# Patient Record
Sex: FEMALE | Race: WHITE | NOT HISPANIC OR LATINO | Employment: STUDENT | URBAN - METROPOLITAN AREA
[De-identification: names, ages, dates, MRNs, and addresses within clinical notes are randomized per-mention and may not be internally consistent; named-entity substitution may affect disease eponyms.]

---
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
## 2025-01-17 ENCOUNTER — OFFICE VISIT (OUTPATIENT)
Dept: PEDIATRICS | Facility: CLINIC | Age: 20
End: 2025-01-17
Payer: COMMERCIAL

## 2025-01-17 VITALS
SYSTOLIC BLOOD PRESSURE: 103 MMHG | DIASTOLIC BLOOD PRESSURE: 70 MMHG | BODY MASS INDEX: 21.74 KG/M2 | HEART RATE: 83 BPM | TEMPERATURE: 98 F | RESPIRATION RATE: 20 BRPM | WEIGHT: 134.69 LBS

## 2025-01-17 DIAGNOSIS — L05.91 PILONIDAL CYST: Primary | ICD-10-CM

## 2025-01-17 PROCEDURE — 3008F BODY MASS INDEX DOCD: CPT | Mod: CPTII,S$GLB,, | Performed by: PEDIATRICS

## 2025-01-17 PROCEDURE — 1159F MED LIST DOCD IN RCRD: CPT | Mod: CPTII,S$GLB,, | Performed by: PEDIATRICS

## 2025-01-17 PROCEDURE — 3074F SYST BP LT 130 MM HG: CPT | Mod: CPTII,S$GLB,, | Performed by: PEDIATRICS

## 2025-01-17 PROCEDURE — 99999 PR PBB SHADOW E&M-EST. PATIENT-LVL III: CPT | Mod: PBBFAC,,, | Performed by: PEDIATRICS

## 2025-01-17 PROCEDURE — 99213 OFFICE O/P EST LOW 20 MIN: CPT | Mod: S$GLB,,, | Performed by: PEDIATRICS

## 2025-01-17 PROCEDURE — 1160F RVW MEDS BY RX/DR IN RCRD: CPT | Mod: CPTII,S$GLB,, | Performed by: PEDIATRICS

## 2025-01-17 PROCEDURE — G2211 COMPLEX E/M VISIT ADD ON: HCPCS | Mod: S$GLB,,, | Performed by: PEDIATRICS

## 2025-01-17 PROCEDURE — 3078F DIAST BP <80 MM HG: CPT | Mod: CPTII,S$GLB,, | Performed by: PEDIATRICS

## 2025-01-17 RX ORDER — CEFUROXIME AXETIL 250 MG/1
250 TABLET ORAL 2 TIMES DAILY
COMMUNITY
Start: 2024-12-28

## 2025-01-17 RX ORDER — ALBUTEROL SULFATE 90 UG/1
INHALANT RESPIRATORY (INHALATION)
COMMUNITY
Start: 2025-01-06

## 2025-01-17 RX ORDER — BENZONATATE 200 MG/1
CAPSULE ORAL
COMMUNITY
Start: 2025-01-06

## 2025-01-17 RX ORDER — MUPIROCIN 20 MG/G
OINTMENT TOPICAL 3 TIMES DAILY
Qty: 30 G | Refills: 1 | Status: SHIPPED | OUTPATIENT
Start: 2025-01-17 | End: 2025-01-27

## 2025-01-17 NOTE — PROGRESS NOTES
" Patient presents for visit accompanied by Mom  CC: pinoidal cyst  HPI: Karolyn is a 20 yo female who has a PMH significant for recurrent pilonidal cysts   Has had some areas of redness and swelling for the past several days  She has been using myrrh which has helped significantly . No cough, congestion, or runny nose. Denies ear pain, or sore throat. No vomiting, or diarrhea.  ALL:Reviewed and or Reconciled.  MEDS:Reviewed and or Reconciled.  IMM:UTD  PMH:problem list reviewed  ROS:   CONSTITUTIONAL:alert, interactive   EYES:no eye discharge   ENT:no URI sx   RESP:nl breathing, no wheezing or shortness of breath   GI: no vomiting or diarrhea   SKIN: see hpi    PHYS. EXAM:vital signs have been reviewed(see nurses notes)   GEN:well nourished, well developed. Pain 0/10   SKIN:normal skin turgor, gluteal cleft with healed scars, no active pilonidal disease    EYES:PERRLA, nl conjuctiva   EARS:nl pinnae, TM's intact, right TM nl, left TM nl   NASAL:mucosa pink, no congestion, no discharge   MOUTH: mucus membranes moist, no pharyngeal erythema   NECK:supple, no masses   RESP:nl resp. effort, clear to auscultation   HEART:RRR, nl s1s2, no murmur or edema   ABD: positive BS, soft, NT,ND,no HSM   MS:nl tone and motor movement of extremities   LYMPH:no cervical nodes   PSYCH:in no acute distress, appropriate and interactive     IMP: Karolyn MADRID "Karolyn" was seen today for cyst.    Diagnoses and all orders for this visit:    Pilonidal cyst  -     mupirocin (BACTROBAN) 2 % ointment; Apply topically 3 (three) times daily. for 10 days      Consider laser hair removal  "

## 2025-06-12 ENCOUNTER — OFFICE VISIT (OUTPATIENT)
Dept: PEDIATRICS | Facility: CLINIC | Age: 20
End: 2025-06-12
Payer: COMMERCIAL

## 2025-06-12 VITALS
RESPIRATION RATE: 20 BRPM | SYSTOLIC BLOOD PRESSURE: 103 MMHG | BODY MASS INDEX: 22.03 KG/M2 | TEMPERATURE: 98 F | HEART RATE: 87 BPM | DIASTOLIC BLOOD PRESSURE: 66 MMHG | HEIGHT: 65 IN | WEIGHT: 132.25 LBS

## 2025-06-12 DIAGNOSIS — R11.0 NAUSEA: ICD-10-CM

## 2025-06-12 DIAGNOSIS — N94.6 DYSMENORRHEA: Primary | ICD-10-CM

## 2025-06-12 PROCEDURE — 99999 PR PBB SHADOW E&M-EST. PATIENT-LVL IV: CPT | Mod: PBBFAC,,, | Performed by: PEDIATRICS

## 2025-06-12 PROCEDURE — 1160F RVW MEDS BY RX/DR IN RCRD: CPT | Mod: CPTII,S$GLB,, | Performed by: PEDIATRICS

## 2025-06-12 PROCEDURE — 3008F BODY MASS INDEX DOCD: CPT | Mod: CPTII,S$GLB,, | Performed by: PEDIATRICS

## 2025-06-12 PROCEDURE — G2211 COMPLEX E/M VISIT ADD ON: HCPCS | Mod: S$GLB,,, | Performed by: PEDIATRICS

## 2025-06-12 PROCEDURE — 3078F DIAST BP <80 MM HG: CPT | Mod: CPTII,S$GLB,, | Performed by: PEDIATRICS

## 2025-06-12 PROCEDURE — 1159F MED LIST DOCD IN RCRD: CPT | Mod: CPTII,S$GLB,, | Performed by: PEDIATRICS

## 2025-06-12 PROCEDURE — 3074F SYST BP LT 130 MM HG: CPT | Mod: CPTII,S$GLB,, | Performed by: PEDIATRICS

## 2025-06-12 PROCEDURE — 99214 OFFICE O/P EST MOD 30 MIN: CPT | Mod: S$GLB,,, | Performed by: PEDIATRICS

## 2025-06-12 RX ORDER — ONDANSETRON 4 MG/1
TABLET, ORALLY DISINTEGRATING ORAL
COMMUNITY
Start: 2025-02-25 | End: 2025-06-12 | Stop reason: SDUPTHER

## 2025-06-12 RX ORDER — ONDANSETRON 4 MG/1
4 TABLET, ORALLY DISINTEGRATING ORAL EVERY 8 HOURS PRN
Qty: 30 TABLET | Refills: 6 | Status: SHIPPED | OUTPATIENT
Start: 2025-06-12 | End: 2025-06-14

## 2025-06-12 RX ORDER — IBUPROFEN 600 MG/1
600 TABLET, FILM COATED ORAL EVERY 6 HOURS PRN
Qty: 60 TABLET | Refills: 6 | Status: SHIPPED | OUTPATIENT
Start: 2025-06-12 | End: 2026-06-12

## 2025-06-12 RX ORDER — KIT FOR PREP TC-99M/SUCCIMER 1 MG
VIAL (EA) INTRAVENOUS
COMMUNITY
End: 2025-06-12

## 2025-06-12 RX ORDER — LORATADINE 10 MG/1
10 TABLET ORAL DAILY
COMMUNITY

## 2025-06-12 NOTE — PROGRESS NOTES
"Patient presents for visit accompanied by parent  CC: painful periods  HPI:  Karolyn is a 21 yo female who presents for painful periods  LMP 2nd week May  Periods 7-9 days  Heavy bleeding lasts 4 days   Has been prescribed zofran which has helped  Will [place] to [place] of [place]  Denies fever. No cough, congestion, or runny nose. Denies ear pain, or sore throat. No vomiting, or diarrhea.    ALL:Reviewed and or Reconciled.  MEDS:Reviewed and or Reconciled.  IMM:UTD  PMH:problem list reviewed    ROS:   CONSTITUTIONAL:alert, interactive   EYES:no eye discharge   ENT:no URI sx   RESP:nl breathing, no wheezing or shortness of breath   GI: no vomiting or diarrhea   SKIN:no rash    PHYS. EXAM:vital signs have been reviewed(see nurses notes)   GEN:well nourished, well developed.    SKIN:normal skin turgor, no lesions    EYES:PERRLA, nl conjuctiva   EARS:nl pinnae, TM's intact, right TM nl, left TM nl   NASAL:mucosa pink, no congestion, no discharge   MOUTH: mucus membranes moist, no pharyngeal erythema   NECK:supple, no masses   RESP:nl resp. effort, clear to auscultation   HEART:RRR, nl s1s2, no murmur or edema   ABD: positive BS, soft, NT,ND,no HSM   MS:nl tone and motor movement of extremities   LYMPH:no cervical nodes   PSYCH:in no acute distress, appropriate and interactive     IMP: Karolyn Delgado" was seen today for medication management and other misc.    Diagnoses and all orders for this visit:    Dysmenorrhea  -     ondansetron (ZOFRAN-ODT) 4 MG TbDL; Take 1 tablet (4 mg total) by mouth every 8 (eight) hours as needed (nausea, vomiting).  -     ibuprofen (ADVIL,MOTRIN) 600 MG tablet; Take 1 tablet (600 mg total) by mouth every 6 (six) hours as needed for Pain.    Nausea  -     ondansetron (ZOFRAN-ODT) 4 MG TbDL; Take 1 tablet (4 mg total) by mouth every 8 (eight) hours as needed (nausea, vomiting).        "

## 2025-06-12 NOTE — LETTER
June 12, 2025    Karolyn Torres  [address] [id number]             [facility]  [address] [address]  Phone: [phone]  Fax: [phone] To whom it may concern,     Lore Torres is a 20 year old patient who is under my care.  At today's visit, she is in great health and able to participate in all activities.  I have no medical concerns for her at this time that would affect her ability to perform or participate safely in activities.     Sincerely,     Dr. Janessa Cardozo

## (undated) DEVICE — SEE MEDLINE ITEM 154981

## (undated) DEVICE — GOWN POLY REINF BRTH SLV XL

## (undated) DEVICE — DRESSING XEROFORM NONADH 1X8IN

## (undated) DEVICE — ELECTRODE NEEDLE 1IN

## (undated) DEVICE — MANIFOLD 4 PORT

## (undated) DEVICE — COVER OVERHEAD SURG LT BLUE

## (undated) DEVICE — SPONGE COTTON TRAY 4X4IN

## (undated) DEVICE — SUT 2-0 ETHILON 18 FS

## (undated) DEVICE — PACK SURGERY START

## (undated) DEVICE — PACK ECLIPSE BASIC III SURG

## (undated) DEVICE — SUT VICRYL 3-0 27 SH

## (undated) DEVICE — TOWEL OR XRAY BLUE 17X26IN

## (undated) DEVICE — SUT CTD VICRYL 3-0 CR/SH

## (undated) DEVICE — PAD PREP CUFFED NS 24X48IN

## (undated) DEVICE — GOWN POLY REINF BRTH SLV LG

## (undated) DEVICE — SKINMARKER W/RULER DEVON

## (undated) DEVICE — APPLICATOR CHLORAPREP ORN 26ML

## (undated) DEVICE — SYR 10CC LUER LOCK

## (undated) DEVICE — DRAPE LAP T SHT W/ INSTR PAD

## (undated) DEVICE — SUPPORT ULNA NERVE PROTECTOR

## (undated) DEVICE — NDL HYPO REG 25G X 1 1/2

## (undated) DEVICE — GLOVE SURGICAL LATEX SZ 8

## (undated) DEVICE — ELECTRODE REM PLYHSV RETURN 9

## (undated) DEVICE — TAPE ADH MEDIPORE 4 X 10YDS